# Patient Record
Sex: FEMALE | Race: WHITE | Employment: UNEMPLOYED | URBAN - METROPOLITAN AREA
[De-identification: names, ages, dates, MRNs, and addresses within clinical notes are randomized per-mention and may not be internally consistent; named-entity substitution may affect disease eponyms.]

---

## 2017-10-16 ENCOUNTER — HOSPITAL ENCOUNTER (OUTPATIENT)
Dept: WOUND CARE | Age: 42
Discharge: HOME | End: 2017-10-16
Payer: SELF-PAY

## 2017-10-16 PROCEDURE — 97602 WOUND(S) CARE NON-SELECTIVE: CPT

## 2017-10-19 PROCEDURE — 97602 WOUND(S) CARE NON-SELECTIVE: CPT

## 2019-11-22 ENCOUNTER — ANESTHESIA EVENT (OUTPATIENT)
Dept: SURGERY | Age: 44
End: 2019-11-22
Payer: MEDICAID

## 2019-11-22 RX ORDER — ATORVASTATIN CALCIUM 40 MG/1
40 TABLET, FILM COATED ORAL
COMMUNITY
Start: 2019-08-12 | End: 2020-02-08

## 2019-11-22 RX ORDER — FENOFIBRATE 145 MG/1
145 TABLET, COATED ORAL DAILY
COMMUNITY
Start: 2019-08-12 | End: 2020-02-08

## 2019-11-22 RX ORDER — FUROSEMIDE 40 MG/1
40 TABLET ORAL DAILY
COMMUNITY
Start: 2019-08-08

## 2019-11-22 RX ORDER — GUAIFENESIN 100 MG/5ML
81 LIQUID (ML) ORAL DAILY
COMMUNITY

## 2019-11-22 RX ORDER — CHOLECALCIFEROL TAB 125 MCG (5000 UNIT) 125 MCG
5000 TAB ORAL DAILY
COMMUNITY

## 2019-11-22 RX ORDER — METOPROLOL SUCCINATE 25 MG/1
25 TABLET, EXTENDED RELEASE ORAL DAILY
COMMUNITY
Start: 2019-10-14 | End: 2020-04-11

## 2019-11-22 RX ORDER — POTASSIUM CHLORIDE 750 MG/1
10 TABLET, FILM COATED, EXTENDED RELEASE ORAL 2 TIMES DAILY
COMMUNITY
Start: 2019-08-08

## 2019-11-22 RX ORDER — AMLODIPINE BESYLATE 10 MG/1
10 TABLET ORAL DAILY
COMMUNITY
Start: 2019-11-05

## 2019-11-22 NOTE — PERIOP NOTES
PAT - SURGICAL PRE-ADMISSION INSTRUCTIONS    NAME:  Jania Sandoval                                                          TODAY'S DATE:  11/22/2019    SURGERY DATE:  11/25/2019                                  SURGERY ARRIVAL TIME:   1300    1. Do NOT eat or drink anything, including candy or gum, after 0500 on 11/25 , unless you have specific instructions from your Surgeon or Anesthesia Provider to do so. 2. No smoking on the day of surgery. 3. No alcohol 24 hours prior to the day of surgery. 4. No recreational drugs for one week prior to the day of surgery. 5. Leave all valuables, including money/purse, at home. 6. Remove all jewelry, nail polish, makeup (including mascara); no lotions, powders, deodorant, or perfume/cologne/after shave. 7. Glasses/Contact lenses and Dentures may be worn to the hospital.  They will be removed prior to surgery. 8. Call your doctor if symptoms of a cold or illness develop within 24 ours prior to surgery. 9. AN ADULT MUST DRIVE YOU HOME AFTER OUTPATIENT SURGERY. 10. If you are having an OUTPATIENT procedure, please make arrangements for a responsible adult to be with you for 24 hours after your surgery. 11. If you are admitted to the hospital, you will be assigned to a bed after surgery is complete. Normally a family member will not be able to see you until you are in your assigned bed. 15. Family is encouraged to accompany you to the hospital.  We ask visitors in the treatment area to be limited to ONE person at a time to ensure patient privacy. EXCEPTIONS WILL BE MADE AS NEEDED. 15. Children under 12 are discouraged from entering the treatment area and need to be supervised by an adult when in the waiting room. Special Instructions: Take these medications the morning of surgery with a sip of water:  Metoprolol and Amlodipine, Follow physician instructions about insulin.   If NO instructions were given, take your usual dose of BASAL insulin the night BEFORE surgery. Patient Prep:    shower with anti-bacterial soap    These surgical instructions were reviewed with Diana Nevarez during the PAT phone call. Directions: On the morning of surgery, please go to the [facility]. Enter the building from the [facility] entrance, 1st floor (next to the Emergency Room entrance). Take the elevator to the 2nd floor. Sign in at the Registration Desk.     If you have any questions and/or concerns, please do not hesitate to call:  (Prior to the day of surgery)  [facility] unit:  [phone]  (Day of surgery)  [facility] unit:  [phone]

## 2019-11-25 ENCOUNTER — HOSPITAL ENCOUNTER (OUTPATIENT)
Age: 44
Setting detail: OUTPATIENT SURGERY
Discharge: HOME | End: 2019-11-25
Attending: OPHTHALMOLOGY | Admitting: OPHTHALMOLOGY
Payer: MEDICAID

## 2019-11-25 ENCOUNTER — ANESTHESIA (OUTPATIENT)
Dept: SURGERY | Age: 44
End: 2019-11-25
Payer: MEDICAID

## 2019-11-25 VITALS
HEART RATE: 88 BPM | TEMPERATURE: 98.1 F | OXYGEN SATURATION: 99 % | DIASTOLIC BLOOD PRESSURE: 77 MMHG | SYSTOLIC BLOOD PRESSURE: 148 MMHG | HEIGHT: 69 IN | WEIGHT: 189 LBS | BODY MASS INDEX: 27.99 KG/M2 | RESPIRATION RATE: 18 BRPM

## 2019-11-25 LAB
BUN BLD-MCNC: 47 MG/DL (ref 7–18)
CHLORIDE BLD-SCNC: 104 MMOL/L (ref 100–108)
GLUCOSE BLD STRIP.AUTO-MCNC: 240 MG/DL (ref 70–110)
GLUCOSE BLD STRIP.AUTO-MCNC: 252 MG/DL (ref 70–110)
GLUCOSE BLD STRIP.AUTO-MCNC: 288 MG/DL (ref 70–110)
GLUCOSE BLD STRIP.AUTO-MCNC: 294 MG/DL (ref 74–106)
HCT VFR BLD CALC: 38 % (ref 36–49)
HGB BLD-MCNC: 12.9 G/DL (ref 12–16)
POTASSIUM BLD-SCNC: 4.7 MMOL/L (ref 3.5–5.5)
SODIUM BLD-SCNC: 136 MMOL/L (ref 136–145)

## 2019-11-25 PROCEDURE — 74011250637 HC RX REV CODE- 250/637: Performed by: NURSE ANESTHETIST, CERTIFIED REGISTERED

## 2019-11-25 PROCEDURE — 74011250636 HC RX REV CODE- 250/636

## 2019-11-25 PROCEDURE — 82962 GLUCOSE BLOOD TEST: CPT

## 2019-11-25 PROCEDURE — 76010000160 HC OR TIME 0.5 TO 1 HR INTENSV-TIER 1: Performed by: OPHTHALMOLOGY

## 2019-11-25 PROCEDURE — 74011000250 HC RX REV CODE- 250: Performed by: OPHTHALMOLOGY

## 2019-11-25 PROCEDURE — 74011000250 HC RX REV CODE- 250: Performed by: NURSE ANESTHETIST, CERTIFIED REGISTERED

## 2019-11-25 PROCEDURE — 76210000006 HC OR PH I REC 0.5 TO 1 HR: Performed by: OPHTHALMOLOGY

## 2019-11-25 PROCEDURE — 76060000032 HC ANESTHESIA 0.5 TO 1 HR: Performed by: OPHTHALMOLOGY

## 2019-11-25 PROCEDURE — 74011250636 HC RX REV CODE- 250/636: Performed by: NURSE ANESTHETIST, CERTIFIED REGISTERED

## 2019-11-25 PROCEDURE — 77030029082 HC LEN VITRCTMY DISP DTCH -B: Performed by: OPHTHALMOLOGY

## 2019-11-25 PROCEDURE — 77030018838 HC SOL IRR OPTH ALCN -B: Performed by: OPHTHALMOLOGY

## 2019-11-25 PROCEDURE — 76210000021 HC REC RM PH II 0.5 TO 1 HR: Performed by: OPHTHALMOLOGY

## 2019-11-25 PROCEDURE — 74011000258 HC RX REV CODE- 258: Performed by: OPHTHALMOLOGY

## 2019-11-25 PROCEDURE — 77030026076 HC LNS MCSCP SPRVW DSP MCGM -B: Performed by: OPHTHALMOLOGY

## 2019-11-25 PROCEDURE — 77030016693: Performed by: OPHTHALMOLOGY

## 2019-11-25 PROCEDURE — 74011250636 HC RX REV CODE- 250/636: Performed by: OPHTHALMOLOGY

## 2019-11-25 PROCEDURE — 77030013311: Performed by: OPHTHALMOLOGY

## 2019-11-25 PROCEDURE — 74011636637 HC RX REV CODE- 636/637: Performed by: ANESTHESIOLOGY

## 2019-11-25 PROCEDURE — 77030018846 HC SOL IRR STRL H20 ICUM -A: Performed by: OPHTHALMOLOGY

## 2019-11-25 PROCEDURE — 74011250637 HC RX REV CODE- 250/637: Performed by: ANESTHESIOLOGY

## 2019-11-25 PROCEDURE — 84295 ASSAY OF SERUM SODIUM: CPT

## 2019-11-25 PROCEDURE — 77030025717 HC KT PIK VIT CONSTL ALCN -F: Performed by: OPHTHALMOLOGY

## 2019-11-25 RX ORDER — PROPARACAINE HYDROCHLORIDE 5 MG/ML
1 SOLUTION/ DROPS OPHTHALMIC ONCE
Status: COMPLETED | OUTPATIENT
Start: 2019-11-25 | End: 2019-11-25

## 2019-11-25 RX ORDER — ONDANSETRON 2 MG/ML
4 INJECTION INTRAMUSCULAR; INTRAVENOUS ONCE
Status: COMPLETED | OUTPATIENT
Start: 2019-11-25 | End: 2019-11-25

## 2019-11-25 RX ORDER — FENTANYL CITRATE 50 UG/ML
INJECTION, SOLUTION INTRAMUSCULAR; INTRAVENOUS AS NEEDED
Status: DISCONTINUED | OUTPATIENT
Start: 2019-11-25 | End: 2019-11-25 | Stop reason: HOSPADM

## 2019-11-25 RX ORDER — SODIUM CHLORIDE, SODIUM LACTATE, POTASSIUM CHLORIDE, CALCIUM CHLORIDE 600; 310; 30; 20 MG/100ML; MG/100ML; MG/100ML; MG/100ML
25 INJECTION, SOLUTION INTRAVENOUS CONTINUOUS
Status: DISCONTINUED | OUTPATIENT
Start: 2019-11-25 | End: 2019-11-25 | Stop reason: HOSPADM

## 2019-11-25 RX ORDER — SCOLOPAMINE TRANSDERMAL SYSTEM 1 MG/1
1 PATCH, EXTENDED RELEASE TRANSDERMAL ONCE
Status: DISCONTINUED | OUTPATIENT
Start: 2019-11-25 | End: 2019-11-25 | Stop reason: HOSPADM

## 2019-11-25 RX ORDER — NALOXONE HYDROCHLORIDE 0.4 MG/ML
0.1 INJECTION, SOLUTION INTRAMUSCULAR; INTRAVENOUS; SUBCUTANEOUS AS NEEDED
Status: DISCONTINUED | OUTPATIENT
Start: 2019-11-25 | End: 2019-11-25 | Stop reason: HOSPADM

## 2019-11-25 RX ORDER — FLUMAZENIL 0.1 MG/ML
0.2 INJECTION INTRAVENOUS
Status: DISCONTINUED | OUTPATIENT
Start: 2019-11-25 | End: 2019-11-25 | Stop reason: HOSPADM

## 2019-11-25 RX ORDER — MAGNESIUM SULFATE 100 %
4 CRYSTALS MISCELLANEOUS AS NEEDED
Status: DISCONTINUED | OUTPATIENT
Start: 2019-11-25 | End: 2019-11-25 | Stop reason: HOSPADM

## 2019-11-25 RX ORDER — INSULIN LISPRO 100 [IU]/ML
INJECTION, SOLUTION INTRAVENOUS; SUBCUTANEOUS ONCE
Status: DISCONTINUED | OUTPATIENT
Start: 2019-11-25 | End: 2019-11-25 | Stop reason: HOSPADM

## 2019-11-25 RX ORDER — LIDOCAINE HYDROCHLORIDE 10 MG/ML
0.1 INJECTION, SOLUTION EPIDURAL; INFILTRATION; INTRACAUDAL; PERINEURAL AS NEEDED
Status: DISCONTINUED | OUTPATIENT
Start: 2019-11-25 | End: 2019-11-25 | Stop reason: HOSPADM

## 2019-11-25 RX ORDER — FAMOTIDINE 20 MG/1
20 TABLET, FILM COATED ORAL ONCE
Status: COMPLETED | OUTPATIENT
Start: 2019-11-25 | End: 2019-11-25

## 2019-11-25 RX ORDER — GLYCOPYRROLATE 0.2 MG/ML
INJECTION INTRAMUSCULAR; INTRAVENOUS AS NEEDED
Status: DISCONTINUED | OUTPATIENT
Start: 2019-11-25 | End: 2019-11-25 | Stop reason: HOSPADM

## 2019-11-25 RX ORDER — PHENYLEPHRINE HYDROCHLORIDE 25 MG/ML
1 SOLUTION/ DROPS OPHTHALMIC
Status: COMPLETED | OUTPATIENT
Start: 2019-11-25 | End: 2019-11-25

## 2019-11-25 RX ORDER — DEXAMETHASONE SODIUM PHOSPHATE 4 MG/ML
INJECTION, SOLUTION INTRA-ARTICULAR; INTRALESIONAL; INTRAMUSCULAR; INTRAVENOUS; SOFT TISSUE AS NEEDED
Status: DISCONTINUED | OUTPATIENT
Start: 2019-11-25 | End: 2019-11-25 | Stop reason: HOSPADM

## 2019-11-25 RX ORDER — SODIUM CHLORIDE 0.9 % (FLUSH) 0.9 %
5-40 SYRINGE (ML) INJECTION EVERY 8 HOURS
Status: DISCONTINUED | OUTPATIENT
Start: 2019-11-25 | End: 2019-11-25 | Stop reason: HOSPADM

## 2019-11-25 RX ORDER — ONDANSETRON 2 MG/ML
INJECTION INTRAMUSCULAR; INTRAVENOUS AS NEEDED
Status: DISCONTINUED | OUTPATIENT
Start: 2019-11-25 | End: 2019-11-25 | Stop reason: HOSPADM

## 2019-11-25 RX ORDER — ATROPINE SULFATE 10 MG/ML
SOLUTION/ DROPS OPHTHALMIC AS NEEDED
Status: DISCONTINUED | OUTPATIENT
Start: 2019-11-25 | End: 2019-11-25 | Stop reason: HOSPADM

## 2019-11-25 RX ORDER — ONDANSETRON 2 MG/ML
INJECTION INTRAMUSCULAR; INTRAVENOUS
Status: COMPLETED
Start: 2019-11-25 | End: 2019-11-25

## 2019-11-25 RX ORDER — DEXTROSE MONOHYDRATE 100 MG/ML
125-250 INJECTION, SOLUTION INTRAVENOUS AS NEEDED
Status: DISCONTINUED | OUTPATIENT
Start: 2019-11-25 | End: 2019-11-25 | Stop reason: HOSPADM

## 2019-11-25 RX ORDER — MIDAZOLAM HYDROCHLORIDE 1 MG/ML
INJECTION, SOLUTION INTRAMUSCULAR; INTRAVENOUS AS NEEDED
Status: DISCONTINUED | OUTPATIENT
Start: 2019-11-25 | End: 2019-11-25 | Stop reason: HOSPADM

## 2019-11-25 RX ORDER — DEXTROSE MONOHYDRATE 25 G/50ML
INJECTION, SOLUTION INTRAVENOUS AS NEEDED
Status: DISCONTINUED | OUTPATIENT
Start: 2019-11-25 | End: 2019-11-25 | Stop reason: HOSPADM

## 2019-11-25 RX ORDER — PROPOFOL 10 MG/ML
INJECTION, EMULSION INTRAVENOUS AS NEEDED
Status: DISCONTINUED | OUTPATIENT
Start: 2019-11-25 | End: 2019-11-25 | Stop reason: HOSPADM

## 2019-11-25 RX ORDER — SODIUM CHLORIDE, SODIUM LACTATE, POTASSIUM CHLORIDE, CALCIUM CHLORIDE 600; 310; 30; 20 MG/100ML; MG/100ML; MG/100ML; MG/100ML
75 INJECTION, SOLUTION INTRAVENOUS CONTINUOUS
Status: DISCONTINUED | OUTPATIENT
Start: 2019-11-25 | End: 2019-11-25 | Stop reason: HOSPADM

## 2019-11-25 RX ORDER — SODIUM CHLORIDE 0.9 % (FLUSH) 0.9 %
5-40 SYRINGE (ML) INJECTION AS NEEDED
Status: DISCONTINUED | OUTPATIENT
Start: 2019-11-25 | End: 2019-11-25 | Stop reason: HOSPADM

## 2019-11-25 RX ORDER — CEFAZOLIN SODIUM 1 G/3ML
INJECTION, POWDER, FOR SOLUTION INTRAMUSCULAR; INTRAVENOUS AS NEEDED
Status: DISCONTINUED | OUTPATIENT
Start: 2019-11-25 | End: 2019-11-25 | Stop reason: HOSPADM

## 2019-11-25 RX ORDER — CYCLOPENTOLATE HYDROCHLORIDE 10 MG/ML
1 SOLUTION/ DROPS OPHTHALMIC
Status: COMPLETED | OUTPATIENT
Start: 2019-11-25 | End: 2019-11-25

## 2019-11-25 RX ORDER — OXYCODONE AND ACETAMINOPHEN 5; 325 MG/1; MG/1
1 TABLET ORAL AS NEEDED
Status: DISCONTINUED | OUTPATIENT
Start: 2019-11-25 | End: 2019-11-25 | Stop reason: HOSPADM

## 2019-11-25 RX ORDER — LIDOCAINE HYDROCHLORIDE 20 MG/ML
INJECTION, SOLUTION EPIDURAL; INFILTRATION; INTRACAUDAL; PERINEURAL AS NEEDED
Status: DISCONTINUED | OUTPATIENT
Start: 2019-11-25 | End: 2019-11-25 | Stop reason: HOSPADM

## 2019-11-25 RX ORDER — TRIAMCINOLONE ACETONIDE 40 MG/ML
INJECTION, SUSPENSION INTRA-ARTICULAR; INTRAMUSCULAR AS NEEDED
Status: DISCONTINUED | OUTPATIENT
Start: 2019-11-25 | End: 2019-11-25 | Stop reason: HOSPADM

## 2019-11-25 RX ADMIN — ONDANSETRON 4 MG: 2 INJECTION INTRAMUSCULAR; INTRAVENOUS at 16:38

## 2019-11-25 RX ADMIN — FAMOTIDINE 20 MG: 20 TABLET ORAL at 14:20

## 2019-11-25 RX ADMIN — SODIUM CHLORIDE, SODIUM LACTATE, POTASSIUM CHLORIDE, AND CALCIUM CHLORIDE: 600; 310; 30; 20 INJECTION, SOLUTION INTRAVENOUS at 14:50

## 2019-11-25 RX ADMIN — PHENYLEPHRINE HYDROCHLORIDE 1 DROP: 25 SOLUTION/ DROPS OPHTHALMIC at 14:28

## 2019-11-25 RX ADMIN — GLYCOPYRROLATE 0.2 MG: 0.2 INJECTION INTRAMUSCULAR; INTRAVENOUS at 15:21

## 2019-11-25 RX ADMIN — PROPOFOL 200 MG: 10 INJECTION, EMULSION INTRAVENOUS at 15:29

## 2019-11-25 RX ADMIN — INSULIN HUMAN 6 UNITS: 100 INJECTION, SOLUTION PARENTERAL at 15:21

## 2019-11-25 RX ADMIN — CYCLOPENTOLATE HYDROCHLORIDE 1 DROP: 10 SOLUTION/ DROPS OPHTHALMIC at 14:29

## 2019-11-25 RX ADMIN — PHENYLEPHRINE HYDROCHLORIDE 1 DROP: 25 SOLUTION/ DROPS OPHTHALMIC at 14:37

## 2019-11-25 RX ADMIN — PROPARACAINE HYDROCHLORIDE 1 DROP: 5 SOLUTION/ DROPS OPHTHALMIC at 14:18

## 2019-11-25 RX ADMIN — CYCLOPENTOLATE HYDROCHLORIDE 1 DROP: 10 SOLUTION/ DROPS OPHTHALMIC at 14:19

## 2019-11-25 RX ADMIN — CYCLOPENTOLATE HYDROCHLORIDE 1 DROP: 10 SOLUTION/ DROPS OPHTHALMIC at 14:37

## 2019-11-25 RX ADMIN — MIDAZOLAM 2 MG: 1 INJECTION INTRAMUSCULAR; INTRAVENOUS at 15:21

## 2019-11-25 RX ADMIN — FENTANYL CITRATE 50 MCG: 50 INJECTION, SOLUTION INTRAMUSCULAR; INTRAVENOUS at 15:29

## 2019-11-25 RX ADMIN — DEXAMETHASONE SODIUM PHOSPHATE 4 MG: 4 INJECTION, SOLUTION INTRA-ARTICULAR; INTRALESIONAL; INTRAMUSCULAR; INTRAVENOUS; SOFT TISSUE at 15:42

## 2019-11-25 RX ADMIN — FENTANYL CITRATE 50 MCG: 50 INJECTION, SOLUTION INTRAMUSCULAR; INTRAVENOUS at 15:21

## 2019-11-25 RX ADMIN — LIDOCAINE HYDROCHLORIDE 80 MG: 20 INJECTION, SOLUTION INTRAVENOUS at 15:29

## 2019-11-25 RX ADMIN — PHENYLEPHRINE HYDROCHLORIDE 1 DROP: 25 SOLUTION/ DROPS OPHTHALMIC at 14:19

## 2019-11-25 RX ADMIN — ONDANSETRON 4 MG: 2 SOLUTION INTRAMUSCULAR; INTRAVENOUS at 15:42

## 2019-11-25 NOTE — PERIOP NOTES
Phase 2 Recovery Summary    Report received from Malik Shore RN    Vitals:    11/25/19 1635 11/25/19 1638 11/25/19 1644 11/25/19 1650   BP:  152/74  148/77   Pulse: 90 88 88 88   Resp: 17 19 19 18   Temp:       SpO2: 98% 97% 98% 99%   Weight:       Height:           oriented to time, place, person and situation    FSBG 252 in phase 2. No insulin coverage per Dr. Clement Letters. Lines and Drains  Peripheral Intravenous Line: Removed prior to discharge. Peripheral IV 11/25/19 Anterior;Right Hand (Active)   Site Assessment Clean, dry, & intact 11/25/2019  4:56 PM   Phlebitis Assessment 0 11/25/2019  4:56 PM   Infiltration Assessment 0 11/25/2019  4:20 PM   Dressing Status Clean, dry, & intact 11/25/2019  4:56 PM   Dressing Type Tape;Transparent 11/25/2019  4:56 PM   Hub Color/Line Status Blue 11/25/2019  4:56 PM       Wound  Wound Eye Left (Active)   Dressing Status Clean, dry, and intact 11/25/2019  4:56 PM   Dressing Type Special tape (comment); Eye patch; Eye shield 11/25/2019  4:56 PM   Number of days: 0        Patient assisted to chair with minimal assist. Pateint tolerating liquids well. Patient's family at bedside. Discharge discussed with patient and family. No questions or concerns at this time. Patient had time to ask any questions. She has a follow up appointment with Dr. Katlin Maurice tomorrow morning. Pain at 4/10. Refused Percocet stating that it doesn't work for her. Patient discharged to home, with fiance.   Derek Bolden RN

## 2019-11-25 NOTE — PERIOP NOTES
Per Dr Montgomery Barrier for no coverage to be given for  as per patient allergic to Humalog insulin

## 2019-11-25 NOTE — DISCHARGE INSTRUCTIONS
Patient Education   Patient armband removed and given to patient to take home. Patient was informed of the privacy risks if armband lost or stolen  DISCHARGE SUMMARY from Nurse    PATIENT INSTRUCTIONS:    After general anesthesia or intravenous sedation, for 24 hours or while taking prescription Narcotics:  · Limit your activities  · Do not drive and operate hazardous machinery  · Do not make important personal or business decisions  · Do  not drink alcoholic beverages  · If you have not urinated within 8 hours after discharge, please contact your surgeon on call. Report the following to your surgeon:  · Excessive pain, swelling, redness or odor of or around the surgical area  · Temperature over 100.5  · Nausea and vomiting lasting longer than 4 hours or if unable to take medications  · Any signs of decreased circulation or nerve impairment to extremity: change in color, persistent  numbness, tingling, coldness or increase pain  · Any questions    What to do at Home:  These are general instructions for a healthy lifestyle:    No smoking/ No tobacco products/ Avoid exposure to second hand smoke  Surgeon General's Warning:  Quitting smoking now greatly reduces serious risk to your health. Obesity, smoking, and sedentary lifestyle greatly increases your risk for illness    A healthy diet, regular physical exercise & weight monitoring are important for maintaining a healthy lifestyle    You may be retaining fluid if you have a history of heart failure or if you experience any of the following symptoms:  Weight gain of 3 pounds or more overnight or 5 pounds in a week, increased swelling in our hands or feet or shortness of breath while lying flat in bed. Please call your doctor as soon as you notice any of these symptoms; do not wait until your next office visit. The discharge information has been reviewed with the {PATIENT PARENT GUARDIAN:31156}.   The {PATIENT PARENT GUARDIAN:79333} verbalized understanding. Discharge medications reviewed with the {Dishcarge meds reviewed [facility]:51551} and appropriate educational materials and side effects teaching were provided. ___________________________________________________________________________________________________________________________________       Vitrectomy: What to Expect at Home  Your Recovery    Vitrectomy is a surgery to remove the vitreous gel from the middle of your eye. Vitreous gel (also called vitreous humor) is a thick, colorless, gel-like fluid that fills the large space in the middle of the eye, behind the lens. It helps the eyeball maintain its shape. During surgery, the doctor used small tools to remove the vitreous gel. (After a while, the eye makes new fluid that fills in the space again.) Then the doctor may have treated eye problems, such as a retinal detachment, a vitreous hemorrhage (bleeding in the eye), scar tissue on the retina, or tears or holes in the macula, an important part of the retina. The retina is the layer of nerve tissue at the back of the eye. At the end of the surgery, the doctor may have injected an oil or gas bubble into the eye. It lightly presses the retina against the wall of the eye. You will need to keep your head in a certain position for most of the day and night while the eye heals. If an oil bubble is used, you will need another surgery to remove the oil after the eye has healed. After the surgery, your eye may be swollen, red, or tender for several weeks. You might have some pain in your eye and your vision may be blurry for a few days after the surgery. You will need 2 to 4 weeks to recover before you can do your normal activities again. It may take longer for your vision to get back to normal.  This care sheet gives you a general idea about how long it will take for you to recover. But each person recovers at a different pace. Follow the steps below to get better as quickly as possible.   How can you care for yourself at home? Activity    · Rest when you feel tired.     · Allow the eye to heal. Don't do things that might cause you to move your head. This includes moving quickly, lifting anything heavy, or doing activities such as cleaning or gardening.     · If your doctor used an oil or gas bubble to hold the retina in place, keep your head in a certain position for most of the day and night for 1 to 3 weeks after the surgery. Make a plan for this part of your recovery, because it will be hard to do some daily activities. Your doctor will give you specific instructions. ? Do not lie on your back, or the bubble will move to the front of the eye and press against the lens instead of the retina.     · If your doctor used a gas bubble, avoid airplane travel until your doctor tells you it is safe. This is because the change in altitude may cause the gas bubble to expand and increase the pressure inside the eye.     · You will probably need to take 2 to 4 weeks off from work. It depends on the type of work you do and how you feel.     · You may drive when your vision allows it. If you are not sure, ask your doctor. Diet    · You can eat your normal diet. If your stomach is upset, try bland, low-fat foods like plain rice, broiled chicken, toast, and yogurt. Medicines    · Your doctor will tell you if and when you can restart your medicines. He or she will also give you instructions about taking any new medicines.     · If you take aspirin or some other blood thinner, be sure to talk to your doctor. He or she will tell you if and when to start taking this medicine again. Make sure that you understand exactly what your doctor wants you to do.     · Be safe with medicines. Read and follow all instructions on the label. ? If the doctor gave you a prescription medicine for pain, take it as prescribed. ? If you are not taking a prescription pain medicine, ask your doctor if you can take an over-the-counter medicine.   · You will need to use eyedrops for up to 6 weeks. Ice and elevation    · Close your eye and put ice or a cold pack on it for 10 to 20 minutes at a time. Try to do this every 1 to 2 hours for the next 3 days (when you are awake) or until the swelling goes down. Put a thin cloth between the ice and your skin. Other instructions    · You can shower and wash your hair and face. But don't get any soap in your eye. You may want to use a wash cloth to wash your face. Some people wear swimming goggles.     · Wear sunglasses during the day. You may have to wear an eye patch or shield for a few days. Follow-up care is a key part of your treatment and safety. Be sure to make and go to all appointments, and call your doctor if you are having problems. It's also a good idea to know your test results and keep a list of the medicines you take. When should you call for help? Call 911 anytime you think you may need emergency care. For example, call if:    · You have a sudden loss of vision.     · You have severe eye pain.    Call your doctor now or seek immediate medical care if:    · Your vision gets worse.     · You have new or increasing eye pain.     · You have symptoms of an eye infection, such as:  ? Pus or thick discharge coming from the eye.  ? Redness or swelling around the eye.  ? A fever.     · You have vision changes or see new flashes or floaters. (Flashes are \"mora\" that you may see when you move your head. Floaters are shadows or dark objects that \"float\" across your field of vision.)    Watch closely for changes in your health, and be sure to contact your doctor if:    · You do not get better as expected. Where can you learn more? Go to http://ronni-nisreen.info/. Enter T466 in the search box to learn more about \"Vitrectomy: What to Expect at Home. \"  Current as of: May 5, 2019  Content Version: 12.2  © 3517-9952 [employer].  Care instructions adapted under license by [employer] (which disclaims liability or warranty for this information). If you have questions about a medical condition or this instruction, always ask your healthcare professional. [address] any warranty or liability for your use of this information.

## 2019-11-25 NOTE — PERIOP NOTES
Checking with CRNA and Dr. Reynaldo Nieves  re: humalog allergy    Orders received. Regular insulin to be ordered by Reynaldo Hernández. No EKG needed    Need Units in Regular Insulin - Attempt x 2 to speak with Alysa Levy. 3rd attempt to call Alysa Levy. Unable to reach. Called Cell phone that was given by pharmacy [phone]  D/c    Per Dr. Reynaldo Nieves give 6 units of Regular Insulin.

## 2019-11-25 NOTE — ANESTHESIA PREPROCEDURE EVALUATION
Relevant Problems   No relevant active problems       Anesthetic History               Review of Systems / Medical History  Patient summary reviewed and pertinent labs reviewed    Pulmonary  Within defined limits                 Neuro/Psych   Within defined limits           Cardiovascular    Hypertension: well controlled          CAD and PAD    Exercise tolerance: >4 METS     GI/Hepatic/Renal         Renal disease: CRI       Endo/Other    Diabetes: poorly controlled, using insulin         Other Findings              Physical Exam    Airway  Mallampati: III  TM Distance: 4 - 6 cm  Neck ROM: normal range of motion   Mouth opening: Normal     Cardiovascular  Regular rate and rhythm,  S1 and S2 normal,  no murmur, click, rub, or gallop             Dental      Comments: Right upper incisor chipped   Pulmonary  Breath sounds clear to auscultation               Abdominal         Other Findings            Anesthetic Plan    ASA: 3  Anesthesia type: MAC          Induction: Intravenous  Anesthetic plan and risks discussed with: Patient      Patient is allergic to Humalog . Will receive Regular insulin instead.

## 2019-11-25 NOTE — OP NOTES
Operative Report       Patient: Sherrell Castro MRN: [id number]  SSN: [id number]    YOB: 1975  Age: 38 y.o. Sex: female       DATE OF OPERATION: 11/25/2019    SURGEON: Elena Zhao MD    ASSISTANT: NONE    PREOPERATIVE DIAGNOSIS:  Tractional retinal detachment left eye                                                       Proliferative diabetic retinopathy left eye    POSTOPERATIVE DIAGNOSIS: Tractional retinal detachment left eye                                                        Proliferative diabetic retinopathy left eye    OPERATION: Pars plana vitrectomy, membrane peel, endolaser, left eye                             ANESTHESIA:  General     DESCRIPTION OF PROCEDURE:   After informed consent,  the patient received general anasthesia following which a local retrobulbar injection on the left eye with a 50/50 mixture of 0.75 % Marcaine and 2% Lidocaine. The eye was prepped and draped in the usual sterile fashion for intraocular surgery. A lid speculum and steri strips were applied to the left eyelid. A 25 gauge 4 mm infusion cannula was placed 3 ½ mm from the limbus inferotemporally, after confirming the position of the cannula, 2 superior 25 gauge sclerostomies were placed. The vitrectomy device and light pipe were inserted thru the superior ports. After clearing the dense hemorrhage from the vitreous cavity attention was turned to the posterior pole where a tractional retinal detachment was noted. Dense epiretinal membranes were seen spanning the central macula. Slowly and meticulously both sharp and blunt dissection were used to segment the membranes off the surface of the retina. The end grasping forceps were then used to peel the membranes off the surface of the retina. The membrane was removed from the eye using the vitrector. No breaks were noted.   Panretinal photocoagulation was delivered to the peripheral retina 360º, for a total of  1037 spots with a 300 mw power.    The trocars and cannulas were removed and the eye was found to be watertight. The subconjunctival injection of Dexamethasone and Ancef were administered. Maxitrol ointment was instilled into the eye. The eye was patched and shielded and she was transferred to the recovery room in stable condition. The patient tolerated the procedure well. There were no problems or complications. The patient was wheeled out of the operating room awake and alert.     Nicole Zelaya MD  November 25, 2019  4:00 PM

## 2019-11-25 NOTE — H&P
Date of Surgery Update:  Nissa hSen was seen and examined. History and physical has been reviewed. The patient has been examined.  There have been no significant clinical changes since the completion of the originally dated History and Physical.    Signed By: Linette Collins MD     November 25, 2019 12:21 PM

## 2019-11-27 NOTE — ANESTHESIA POSTPROCEDURE EVALUATION
Procedure(s):  Left eye VITRECTOMY POSTERIOR 25 GAUGE with laser treatment. general    Anesthesia Post Evaluation      Multimodal analgesia: multimodal analgesia used between 6 hours prior to anesthesia start to PACU discharge  Patient location during evaluation: bedside  Patient participation: complete - patient participated  Level of consciousness: awake  Pain score: 1  Pain management: adequate  Airway patency: patent  Anesthetic complications: no  Cardiovascular status: stable  Respiratory status: acceptable  Hydration status: acceptable  Post anesthesia nausea and vomiting:  controlled      Vitals Value Taken Time   /74 11/25/2019  4:38 PM   Temp 36.7 °C (98.1 °F) 11/25/2019  4:02 PM   Pulse 88 11/25/2019  4:45 PM   Resp 18 11/25/2019  4:45 PM   SpO2 97 % 11/25/2019  4:45 PM   Vitals shown include unvalidated device data.

## 2023-06-29 ENCOUNTER — HOSPITAL ENCOUNTER (OUTPATIENT)
Facility: HOSPITAL | Age: 48
Discharge: HOME | End: 2023-07-02

## 2023-06-29 LAB — SENTARA SPECIMEN COLLECTION: NORMAL

## (undated) DEVICE — SURGICAL PROCEDURE PACK EYE 25 GA CUST

## (undated) DEVICE — FILTER NEEDLE: Brand: MONOJECT

## (undated) DEVICE — SOLUTION IRRIG 1000ML H2O STRL BLT

## (undated) DEVICE — SYRINGE TB 1ML NDL 25GA L0.625IN PLAS SLIP TIP CONVENTIONAL

## (undated) DEVICE — SUPER VIEW® STERILE LENS PACK FOR USE WITH THE SUPER VIEW® SYSTEM AND THE BIOM®: Brand: SUPER VIEW® DISPOSABLE LENS SET

## (undated) DEVICE — NEEDLE HYPO 30GA L0.5IN BGE POLYPR HUB S STL REG BVL STR

## (undated) DEVICE — Device: Brand: 25G THUMB ADJUSTABLE & INTUITIVE ENDOPROBE® BOX OF 6

## (undated) DEVICE — MICROSURGICAL INSTRUMENT 25GA SOFT TIP NEEDLE: Brand: ALCON

## (undated) DEVICE — LIGHT HANDLE: Brand: DEVON

## (undated) DEVICE — NDL PRT INJ NSAF BLNT 18GX1.5 --

## (undated) DEVICE — CANNULA ANTR CHMBR OPHTH WASHOUT 19GA

## (undated) DEVICE — SYR 3ML LL TIP 1/10ML GRAD --

## (undated) DEVICE — 3M™ TEGADERM™ HP TRANSPARENT FILM DRESSING FRAME STYLE, 9534HP, 2-3/8 X 2-3/4 IN (6 CM X 7 CM), 100/CT 4CT/CASE: Brand: 3M™ TEGADERM™

## (undated) DEVICE — SOL ANTI-FOG 6ML MEDC -- MEDICHOICE - CONVERT TO 358427

## (undated) DEVICE — SURGICAL PROCEDURE PACK VITRECTOMY EYE CUST

## (undated) DEVICE — STERILE LATEX POWDER-FREE SURGICAL GLOVESWITH NITRILE COATING: Brand: PROTEXIS

## (undated) DEVICE — LENS VITRCTMY FLAT DISP

## (undated) DEVICE — SYR 10ML LUER LOK 1/5ML GRAD --

## (undated) DEVICE — MEDI-VAC NON-CONDUCTIVE SUCTION TUBING 6MM X 6.1M (20 FT.) L: Brand: CARDINAL HEALTH

## (undated) DEVICE — SOLUTION IRRIGATION BAL SALT SOLUTION 500 ML BTL 6/CA BSS +